# Patient Record
Sex: MALE | Race: WHITE | NOT HISPANIC OR LATINO | Employment: FULL TIME | URBAN - METROPOLITAN AREA
[De-identification: names, ages, dates, MRNs, and addresses within clinical notes are randomized per-mention and may not be internally consistent; named-entity substitution may affect disease eponyms.]

---

## 2022-06-30 ENCOUNTER — HOSPITAL ENCOUNTER (EMERGENCY)
Facility: CLINIC | Age: 32
Discharge: HOME | End: 2022-06-30
Attending: EMERGENCY MEDICINE | Admitting: EMERGENCY MEDICINE
Payer: COMMERCIAL

## 2022-06-30 VITALS
OXYGEN SATURATION: 98 % | WEIGHT: 165 LBS | HEART RATE: 64 BPM | HEIGHT: 70 IN | DIASTOLIC BLOOD PRESSURE: 98 MMHG | RESPIRATION RATE: 16 BRPM | TEMPERATURE: 97 F | SYSTOLIC BLOOD PRESSURE: 140 MMHG | BODY MASS INDEX: 23.62 KG/M2

## 2022-06-30 DIAGNOSIS — T18.108A IMPACTED FOREIGN BODY IN ESOPHAGUS, INITIAL ENCOUNTER: ICD-10-CM

## 2022-06-30 PROCEDURE — 99282 EMERGENCY DEPT VISIT SF MDM: CPT

## 2022-06-30 ASSESSMENT — ENCOUNTER SYMPTOMS: TROUBLE SWALLOWING: 1

## 2022-06-30 NOTE — ED NOTES
Bed: ED04  Expected date: 6/30/22  Expected time: 8:30 AM  Means of arrival:   Comments:  Triage

## 2022-06-30 NOTE — ED PROVIDER NOTES
"  History   Chief Complaint:  Problems Swallowing (Pt sts he has been unable to keep anything down since yesterday am)       HPI   Nabeel Junior is a 31 year old male who presents with trouble swallowing. Per the patient, yesterday morning he ate his typical breakfast of a granola bar and protein shake. He had no symptoms at the time. At lunch yesterday he tried eating and drinking and could not get anything down. Since then he has been unable to drink, even sips of water. He reports he has had a couple of episodes of feeling like something is stuck in his chest when eating but he has always been able to walk it off before. He reports his dad has achalasia but he himself has never seen a doctor for it.     Review of Systems   HENT: Positive for trouble swallowing.      Allergies:  Amoxacillin  Clindamycin      Medications:  None.     Past Medical History:     No past medical history reported.      Past Family History:  Achalasia - Father     Social History:  Patient lived with his fiancee.  Patient reports occasional alcohol but no tobacco use.     Physical Exam     Patient Vitals for the past 24 hrs:   BP Temp Temp src Pulse Resp SpO2 Height Weight   06/30/22 1000 (!) 140/98 -- -- 64 -- -- -- --   06/30/22 0945 -- -- -- -- -- 98 % -- --   06/30/22 0836 (!) 141/93 97  F (36.1  C) Temporal 80 16 99 % 1.778 m (5' 10\") 74.8 kg (165 lb)       Physical Exam   Constitutional: Patient is a young white male, sitting, no respiratory distress.   HENT: No signs of trauma.   Eyes: EOM are normal. Pupils are equal, round, and reactive to light.   Neck: Normal range of motion. No JVD present. No cervical adenopathy.  Cardiovascular: Regular rhythm.  Exam reveals no gallop and no friction rub.    No murmur heard.  Pulmonary/Chest: Bilateral breath sounds normal. No wheezes, rhonchi or rales.  Abdominal: Soft. No tenderness. No rebound or guarding.   Musculoskeletal: No edema. No tenderness.   Lymphadenopathy: No lymphadenopathy. "   Neurological: Alert and oriented to person, place, and time. Normal strength. Coordination normal.   Skin: Skin is warm and dry. No rash noted. No erythema.     Emergency Department Course     Emergency Department Course:    Reviewed:  I reviewed nursing notes, vitals and past medical history    Assessments:  0839 I obtained history and examined the patient as noted above.   0954 I rechecked the patient and explained findings.     Disposition:  The patient was discharged to home.     Impression & Plan     Medical Decision Making:  Nabeel Junior is a 31 year old male who comes in stating he is unable to swallow anything. Yesterday he had a protein shake and a granola bar for breakfast. When he tried to eat lunch or any water it would eventually come up. Since then he has been unable to keep anything down and his saliva continues to come up. He has had some difficulty swallowing in the past but has been able to walk around and usually it goes away. His father has suffered from achalasia. Patient has never seen GI. On exam here he is holding an emesis bag but with an otherwise normal exam. Patient was given Easy Gas and after this he was now able to drink water and felt that his problem had resolved. We will discharge the patient. I have warned him to eat very carefully and to chew up his food very well. I have referred him to Dr. Davis for further GI evaluation.       Diagnosis:    ICD-10-CM    1. Impacted foreign body in esophagus, initial encounter  T18.108A      Scribe Disclosure:  I, Tk Lepe, am serving as a scribe at 8:34 AM on 6/30/2022 to document services personally performed by David Badillo MD based on my observations and the provider's statements to me.        David Badillo MD  06/30/22 9720

## 2022-06-30 NOTE — ED NOTES
Patient given EzGas.  Patient is unsure if he feels improved or not.  Given some water to take small sips on.